# Patient Record
Sex: MALE | Race: WHITE | NOT HISPANIC OR LATINO
[De-identification: names, ages, dates, MRNs, and addresses within clinical notes are randomized per-mention and may not be internally consistent; named-entity substitution may affect disease eponyms.]

---

## 2018-09-28 PROBLEM — Z00.00 ENCOUNTER FOR PREVENTIVE HEALTH EXAMINATION: Status: ACTIVE | Noted: 2018-09-28

## 2018-10-04 ENCOUNTER — APPOINTMENT (OUTPATIENT)
Dept: ORTHOPEDIC SURGERY | Facility: CLINIC | Age: 43
End: 2018-10-04
Payer: COMMERCIAL

## 2018-10-04 VITALS — WEIGHT: 175 LBS | BODY MASS INDEX: 26.52 KG/M2 | HEIGHT: 68 IN

## 2018-10-04 VITALS — DIASTOLIC BLOOD PRESSURE: 95 MMHG | HEART RATE: 73 BPM | SYSTOLIC BLOOD PRESSURE: 147 MMHG

## 2018-10-04 DIAGNOSIS — F19.90 OTHER PSYCHOACTIVE SUBSTANCE USE, UNSPECIFIED, UNCOMPLICATED: ICD-10-CM

## 2018-10-04 DIAGNOSIS — Z78.9 OTHER SPECIFIED HEALTH STATUS: ICD-10-CM

## 2018-10-04 DIAGNOSIS — Z60.2 PROBLEMS RELATED TO LIVING ALONE: ICD-10-CM

## 2018-10-04 PROCEDURE — 73562 X-RAY EXAM OF KNEE 3: CPT | Mod: LT

## 2018-10-04 PROCEDURE — 99203 OFFICE O/P NEW LOW 30 MIN: CPT

## 2018-10-04 SDOH — SOCIAL STABILITY - SOCIAL INSECURITY: PROBLEMS RELATED TO LIVING ALONE: Z60.2

## 2019-01-05 ENCOUNTER — OUTPATIENT (OUTPATIENT)
Dept: OUTPATIENT SERVICES | Facility: HOSPITAL | Age: 44
LOS: 1 days | End: 2019-01-05
Payer: COMMERCIAL

## 2019-01-05 VITALS
HEIGHT: 67.25 IN | HEART RATE: 82 BPM | RESPIRATION RATE: 16 BRPM | OXYGEN SATURATION: 97 % | TEMPERATURE: 97 F | DIASTOLIC BLOOD PRESSURE: 78 MMHG | SYSTOLIC BLOOD PRESSURE: 126 MMHG | WEIGHT: 181 LBS

## 2019-01-05 DIAGNOSIS — Z98.52 VASECTOMY STATUS: Chronic | ICD-10-CM

## 2019-01-05 DIAGNOSIS — G47.33 OBSTRUCTIVE SLEEP APNEA (ADULT) (PEDIATRIC): ICD-10-CM

## 2019-01-05 DIAGNOSIS — S83.512A SPRAIN OF ANTERIOR CRUCIATE LIGAMENT OF LEFT KNEE, INITIAL ENCOUNTER: ICD-10-CM

## 2019-01-05 DIAGNOSIS — T14.8XXA OTHER INJURY OF UNSPECIFIED BODY REGION, INITIAL ENCOUNTER: ICD-10-CM

## 2019-01-05 DIAGNOSIS — T78.40XA ALLERGY, UNSPECIFIED, INITIAL ENCOUNTER: ICD-10-CM

## 2019-01-05 LAB
BUN SERPL-MCNC: 15 MG/DL — SIGNIFICANT CHANGE UP (ref 7–23)
CALCIUM SERPL-MCNC: 9.5 MG/DL — SIGNIFICANT CHANGE UP (ref 8.4–10.5)
CHLORIDE SERPL-SCNC: 101 MMOL/L — SIGNIFICANT CHANGE UP (ref 98–107)
CO2 SERPL-SCNC: 27 MMOL/L — SIGNIFICANT CHANGE UP (ref 22–31)
CREAT SERPL-MCNC: 0.95 MG/DL — SIGNIFICANT CHANGE UP (ref 0.5–1.3)
GLUCOSE SERPL-MCNC: 88 MG/DL — SIGNIFICANT CHANGE UP (ref 70–99)
HCT VFR BLD CALC: 43.8 % — SIGNIFICANT CHANGE UP (ref 39–50)
HGB BLD-MCNC: 14.8 G/DL — SIGNIFICANT CHANGE UP (ref 13–17)
MCHC RBC-ENTMCNC: 29.5 PG — SIGNIFICANT CHANGE UP (ref 27–34)
MCHC RBC-ENTMCNC: 33.8 % — SIGNIFICANT CHANGE UP (ref 32–36)
MCV RBC AUTO: 87.3 FL — SIGNIFICANT CHANGE UP (ref 80–100)
NRBC # FLD: 0 K/UL — LOW (ref 25–125)
PLATELET # BLD AUTO: 299 K/UL — SIGNIFICANT CHANGE UP (ref 150–400)
PMV BLD: 9.2 FL — SIGNIFICANT CHANGE UP (ref 7–13)
POTASSIUM SERPL-MCNC: 4.1 MMOL/L — SIGNIFICANT CHANGE UP (ref 3.5–5.3)
POTASSIUM SERPL-SCNC: 4.1 MMOL/L — SIGNIFICANT CHANGE UP (ref 3.5–5.3)
RBC # BLD: 5.02 M/UL — SIGNIFICANT CHANGE UP (ref 4.2–5.8)
RBC # FLD: 12.8 % — SIGNIFICANT CHANGE UP (ref 10.3–14.5)
SODIUM SERPL-SCNC: 141 MMOL/L — SIGNIFICANT CHANGE UP (ref 135–145)
WBC # BLD: 9.05 K/UL — SIGNIFICANT CHANGE UP (ref 3.8–10.5)
WBC # FLD AUTO: 9.05 K/UL — SIGNIFICANT CHANGE UP (ref 3.8–10.5)

## 2019-01-05 PROCEDURE — 93010 ELECTROCARDIOGRAM REPORT: CPT

## 2019-01-05 NOTE — H&P PST ADULT - HISTORY OF PRESENT ILLNESS
Pt is a 43 y.o. male ; pt states 8/18 ;sustained injury to right knee while playing hockey ; pt to surgeon an mri was done ' There is an acl tear ' Pt now presents for Left Knee Anterior Cruciate Ligament Reconstruction Pt is a 43 y.o. male ; pt states 8/18 ;sustained injury to left knee while playing hockey ; pt to surgeon an mri was done "  There is an acl tear ' Pt now presents for Left Knee Anterior Cruciate Ligament Reconstruction

## 2019-01-05 NOTE — H&P PST ADULT - LYMPHATIC
anterior cervical R/anterior cervical L/supraclavicular L/posterior cervical L/posterior cervical R/supraclavicular R

## 2019-01-05 NOTE — H&P PST ADULT - PROBLEM SELECTOR PLAN 1
Left Knee Anterior Cruciate Ligament Reconstructions    Pre op instructions including Pepcid and Hibiclens given to pt ; pt appears to have a good understanding of pre op instructions

## 2019-01-05 NOTE — H&P PST ADULT - PMH
Cold  pt rwports resolving cold  MICA (obstructive sleep apnea)  pt reports " mild mica" pt denies cpap Cold  pt reports resolving cold  MICA (obstructive sleep apnea)  pt reports " mild mica" pt denies cpap

## 2019-01-07 PROBLEM — G47.33 OBSTRUCTIVE SLEEP APNEA (ADULT) (PEDIATRIC): Chronic | Status: ACTIVE | Noted: 2019-01-05

## 2019-01-07 PROBLEM — J00 ACUTE NASOPHARYNGITIS [COMMON COLD]: Chronic | Status: ACTIVE | Noted: 2019-01-05

## 2019-01-17 ENCOUNTER — TRANSCRIPTION ENCOUNTER (OUTPATIENT)
Age: 44
End: 2019-01-17

## 2019-01-17 RX ORDER — ASPIRIN 325 MG/1
325 TABLET, FILM COATED ORAL DAILY
Qty: 28 | Refills: 0 | Status: ACTIVE | COMMUNITY
Start: 2019-01-17 | End: 1900-01-01

## 2019-01-17 RX ORDER — OXYCODONE AND ACETAMINOPHEN 5; 325 MG/1; MG/1
5-325 TABLET ORAL
Qty: 70 | Refills: 0 | Status: ACTIVE | COMMUNITY
Start: 2019-01-17 | End: 1900-01-01

## 2019-01-18 ENCOUNTER — OUTPATIENT (OUTPATIENT)
Dept: OUTPATIENT SERVICES | Facility: HOSPITAL | Age: 44
LOS: 1 days | Discharge: HOME | End: 2019-01-18
Payer: COMMERCIAL

## 2019-01-18 ENCOUNTER — APPOINTMENT (OUTPATIENT)
Dept: ORTHOPEDIC SURGERY | Facility: AMBULATORY SURGERY CENTER | Age: 44
End: 2019-01-18

## 2019-01-18 VITALS
HEART RATE: 79 BPM | HEIGHT: 67.25 IN | RESPIRATION RATE: 16 BRPM | SYSTOLIC BLOOD PRESSURE: 149 MMHG | WEIGHT: 181 LBS | OXYGEN SATURATION: 99 % | TEMPERATURE: 98 F | DIASTOLIC BLOOD PRESSURE: 89 MMHG

## 2019-01-18 VITALS
SYSTOLIC BLOOD PRESSURE: 124 MMHG | DIASTOLIC BLOOD PRESSURE: 80 MMHG | HEART RATE: 72 BPM | RESPIRATION RATE: 15 BRPM | OXYGEN SATURATION: 97 % | TEMPERATURE: 98 F

## 2019-01-18 DIAGNOSIS — Z98.52 VASECTOMY STATUS: Chronic | ICD-10-CM

## 2019-01-18 DIAGNOSIS — S83.512A SPRAIN OF ANTERIOR CRUCIATE LIGAMENT OF LEFT KNEE, INITIAL ENCOUNTER: ICD-10-CM

## 2019-01-18 PROCEDURE — 29888 ARTHRS AID ACL RPR/AGMNTJ: CPT | Mod: LT

## 2019-01-18 RX ORDER — IBUPROFEN 200 MG
1 TABLET ORAL
Qty: 0 | Refills: 0 | COMMUNITY

## 2019-01-18 RX ORDER — FAMOTIDINE 10 MG/ML
20 INJECTION INTRAVENOUS
Qty: 0 | Refills: 0 | COMMUNITY

## 2019-01-18 NOTE — ASU DISCHARGE PLAN (ADULT/PEDIATRIC). - FOLLOWUP APPOINTMENT CLINIC/PHYSICIAN
Follow up with doctor in 1 week. Call to make appointment.     Begin Physical therapy 1-3 days after surgery.

## 2019-01-18 NOTE — BRIEF OPERATIVE NOTE - POST-OP DX
Complex tear of medial meniscus of right knee as current injury, initial encounter  01/18/2019    Active  Dawit Ren Rupture of anterior cruciate ligament of left knee, initial encounter  01/18/2019    Active  Dawit Ren

## 2019-01-18 NOTE — ASU DISCHARGE PLAN (ADULT/PEDIATRIC). - NOTIFY
Pain not relieved by Medications/Bleeding that does not stop/Numbness, color, or temperature change to extremity/Numbness, tingling/Swelling that continues

## 2019-01-18 NOTE — BRIEF OPERATIVE NOTE - PROCEDURE
<<-----Click on this checkbox to enter Procedure Knee arthroscopy, right  01/18/2019    Active  GIL ACL reconstruction  01/18/2019    Active  GIL

## 2019-01-18 NOTE — ASU DISCHARGE PLAN (ADULT/PEDIATRIC). - MEDICATION SUMMARY - MEDICATIONS TO STOP TAKING
I will STOP taking the medications listed below when I get home from the hospital:    Advil 200 mg oral tablet  -- 1 tab(s) by mouth every 6 hours, As Needed last dose 1/11    famotidine 20 mg oral tablet  -- 20 milligram(s) orally

## 2019-01-31 ENCOUNTER — APPOINTMENT (OUTPATIENT)
Dept: ORTHOPEDIC SURGERY | Facility: CLINIC | Age: 44
End: 2019-01-31
Payer: COMMERCIAL

## 2019-01-31 PROCEDURE — 99024 POSTOP FOLLOW-UP VISIT: CPT

## 2019-01-31 NOTE — HISTORY OF PRESENT ILLNESS
[2] : the patient reports pain that is 2/10 in severity [Chills] : no chills [Fever] : no fever [Nausea] : no nausea [Vomiting] : no vomiting [Clean/Dry/Intact] : clean, dry and intact [Healed] : healed [Erythema] : not erythematous [Discharge] : absent of discharge [Swelling] : swollen [Dehiscence] : not dehisced [Neuro Intact] : an unremarkable neurological exam [Vascular Intact] : ~T peripheral vascular exam normal [Doing Well] : is doing well [Excellent Pain Control] : has excellent pain control [No Sign of Infection] : is showing no signs of infection [Sutures Removed] : sutures were removed [Steri-Strips Removed & Replaced] : steri-strips removed and replaced [de-identified] : 43-year-old male status post left ACL (Ham auto/allo) 1/18/19 [de-identified] : 43-year-old male status post left ACL (Ham auto/allo). He is doing well resting in postop brace. He is not taking pain medication. Denies post op complications.  [de-identified] : Left knee exam\par \par Skin: Incision(s) clean, dry, intact, no drainage, healed\par Inspection: Residual swelling, moderate residual effusion, ecchymosis\par Pulses: 2+ DP/PT pulses\par ROM: Not tested. 0-45\par Tenderness: Tender throughout anterior knee joint.\par Stability: Stable\par Strength: Intact Q/H/TA/GS/EHL\par Neuro: In tact to light touch throughout\par  [de-identified] : 43-year-old male status post left ACL (Ham auto/allo)\par \par All intraoperative imaging was discussed in detail with the patient. All questions were answered. Patient has minimal pain, and is ambulating well without assist.\par \par Recommendations:\par \par 1. PT evaluation and treatment; including AAROM/AROM, therapeutic exercise (include hamstring and quadriceps strengthening), heel slides, nonweightbearing stretch of the gastrocsoleus complex and straight leg raises to prevent extension lag. Progression to closed chain exercises/balance exercise/bike in 2-4 weeks.\par 2. Brace: Unlock brace for ambulation as tolerated. Remove the brace for sleeping as tolerated. May discontinue brace once full extension and without extensor lag has been achieved (approx 4-6wks post-op)\par 3. Meds: Continue [id number] daily, pain medication prn, may transition to NSAIDS.\par 4. Ice/elevate as neededand\par 5. Restrictions: None\par \par Followup in 4 weeks for imaging and clinical evaluation of stability.

## 2019-02-28 ENCOUNTER — APPOINTMENT (OUTPATIENT)
Dept: ORTHOPEDIC SURGERY | Facility: CLINIC | Age: 44
End: 2019-02-28
Payer: COMMERCIAL

## 2019-02-28 PROCEDURE — 99024 POSTOP FOLLOW-UP VISIT: CPT

## 2019-02-28 PROCEDURE — 73562 X-RAY EXAM OF KNEE 3: CPT | Mod: LT

## 2019-02-28 NOTE — HISTORY OF PRESENT ILLNESS
[0] : no pain reported [Clean/Dry/Intact] : clean, dry and intact [Healed] : healed [Swelling] : swollen [Neuro Intact] : an unremarkable neurological exam [Vascular Intact] : ~T peripheral vascular exam normal [Doing Well] : is doing well [Excellent Pain Control] : has excellent pain control [No Sign of Infection] : is showing no signs of infection [Chills] : no chills [Fever] : no fever [Nausea] : no nausea [Vomiting] : no vomiting [Erythema] : not erythematous [Discharge] : absent of discharge [Dehiscence] : not dehisced [de-identified] : 43-year-old male status post left ACL (Ham auto/allo) 1/18/19 [de-identified] : 43-year-old male status post left ACL (Ham auto/allo). He is doing well, discontinued postop brace. He is not taking pain medication. Denies post op complications. Intermittent pain, otherwise well. Progressing with PT. [de-identified] : Left knee exam\par \par Skin: Incision(s) clean, dry, intact, no drainage, healed\par Inspection: Min swelling, moderate residual effusion, ecchymosis\par Pulses: 2+ DP/PT pulses\par ROM: 0-125\par Tenderness: Min\par Stability: Stable, IA lachman\par Strength: 4/5 Q/H 5/5 TA/GS/EHL, \par Neuro: In tact to light touch throughout [de-identified] : \par The following radiographs were ordered and read by me during this patients visit. I reviewed each radiograph in detail with the patient and discussed the findings as highlighted below. \par \par 3 views of the left knee were obtained today that show interval change of an anatomic anterior cruciate ligament reconstruction with hamstring autograft/allograft. No evidence of hardware failure, otherwise unremarkable.  [de-identified] : 43-year-old male status post left ACL (Ham auto/allo)\par \par Postoperative imaging shows anatomic ACL reconstruction. Patient has good clinical stability on examination, and is doing well with postoperative rehabilitation at this time. Patient is doing extremely well at this time. Discussed the continued progressions as below.\par \par Recommendations:\par \par 1. Continue PT per protocol; WBAT, closed chain exercises, proprioception exercise, begin elliptical when tolerated, hamstring stretching/strengthening\par 2. Brace: May discontinue brace once full extension and without extensor lag has been achieved\par 3. Meds: Discontinue [id number] daily, OTC pain/Nsaids medication prn\par 4. Continue symptommatic Ice/elevate as needed\par 5. Restrictions: None\par \par Followup in 6 weeks. \par \par

## 2019-04-23 ENCOUNTER — APPOINTMENT (OUTPATIENT)
Dept: ORTHOPEDIC SURGERY | Facility: CLINIC | Age: 44
End: 2019-04-23
Payer: COMMERCIAL

## 2019-04-23 VITALS — BODY MASS INDEX: 26.52 KG/M2 | WEIGHT: 175 LBS | HEIGHT: 68 IN

## 2019-04-23 PROCEDURE — 99213 OFFICE O/P EST LOW 20 MIN: CPT

## 2019-05-07 ENCOUNTER — RX RENEWAL (OUTPATIENT)
Age: 44
End: 2019-05-07

## 2019-07-23 ENCOUNTER — APPOINTMENT (OUTPATIENT)
Dept: ORTHOPEDIC SURGERY | Facility: CLINIC | Age: 44
End: 2019-07-23
Payer: COMMERCIAL

## 2019-07-23 PROCEDURE — 99213 OFFICE O/P EST LOW 20 MIN: CPT

## 2019-07-23 PROCEDURE — 73562 X-RAY EXAM OF KNEE 3: CPT | Mod: LT

## 2019-09-06 NOTE — HISTORY OF PRESENT ILLNESS
[de-identified] : 43-year-old male status post left ACL (Ham auto/allo) 1/18/19. Reports occasional tightness on the lateral aspect of his knee. Attending PT 2 x per week. He has no complaints. He is still doing physical therapy. He denies instability. \par

## 2019-09-06 NOTE — PHYSICAL EXAM
[de-identified] : Left knee exam\par \par Skin: Incision(s) clean, dry, intact, no drainage, healed\par Inspection: no swelling, no effusion\par Pulses: 2+ DP/PT pulses\par ROM: 0-135\par Tenderness: none\par Stability: Stable, IA lachman\par Strength: 4+/5 Q/H 5/5 TA/GS/EHL, \par Neuro: In tact to light touch throughout [de-identified] : \par The following radiographs were ordered and read by me during this patients visit. I reviewed each radiograph in detail with the patient and discussed the findings as highlighted below. \par \par 3 views of the left knee were obtained today that show interval change of an anatomic anterior cruciate ligament reconstruction with hamstring autograft. No evidence of hardware failure, otherwise unremarkable. Mild distal tunnel widening.

## 2019-09-06 NOTE — DISCUSSION/SUMMARY
[de-identified] : 43-year-old male status post left ACL (Ham auto/allo)\par \par Patient has had an uncomplicated postoperative course following left anterior cruciate ligament reconstruction with hamstring autograft/allograft. Currently the patient reports good subjective stability and functional outcome and has completed a rigorous physical therapy protocol. \par \par Discussion was centered today on goals of activity and return to pivoting/cutting exercise. Outlined subjectivity on return to play and sport specific exercise, and possible utilization of objective return to play criteria through a physical therapy guided RTP protocol. Also discussed the utility of bracing in the postoperative period for return to sport. It is not typically recommended given stability of construct, but patient may continue to experience anxiety/loss of confidence over the ensuing months.\par \par Recommendations:\par 1. Continued flexibility training, endurance training and strengthening/conditioning to the quadriceps/hamstring/core to maintain stability. Full return to sport at 9-12months \par 2. Completion of a RTP program when able\par 3. Utilize ice/rest as needed\par 4. Bracing as discussed\par \par Follow-up:\par As needed following completion RTP program

## 2019-10-02 PROBLEM — Z60.2 PERSON LIVING ALONE: Status: ACTIVE | Noted: 2018-10-04

## 2019-10-21 ENCOUNTER — APPOINTMENT (OUTPATIENT)
Dept: ORTHOPEDIC SURGERY | Facility: CLINIC | Age: 44
End: 2019-10-21
Payer: COMMERCIAL

## 2019-10-21 DIAGNOSIS — S89.92XD UNSPECIFIED INJURY OF LEFT LOWER LEG, SUBSEQUENT ENCOUNTER: ICD-10-CM

## 2019-10-21 PROCEDURE — 99213 OFFICE O/P EST LOW 20 MIN: CPT

## 2019-10-23 PROBLEM — S89.92XD ACUTE INJURY OF LEFT ANTERIOR CRUCIATE LIGAMENT, SUBSEQUENT ENCOUNTER: Status: ACTIVE | Noted: 2018-11-09

## 2019-10-23 NOTE — ADDENDUM
[FreeTextEntry1] : This note was written by Elena Alves on 10/21/2019 acting solely as a scribe for Dr. Everett Rosario.\par \par All medical record entries made by the Scribe were at my, Dr. Everett Rosario, direction and personally dictated by me on 10/21/2019. I have personally reviewed the chart and agree that the record accurately reflects my personal performance of the history, physical exam, assessment and plan.\par

## 2019-10-23 NOTE — HISTORY OF PRESENT ILLNESS
[de-identified] : 44-year-old male status post left ACL (Ham auto/allo) 1/18/19. He states that he feels like "something" is in his knee, and cannot shake this feeling. He wonders whether this is normal vs. abnormal. He is able to run and be active without pain. Reports soreness/ tightness at the end of day. He denies instability. He is not taking pain medication. \par

## 2019-10-23 NOTE — DISCUSSION/SUMMARY
[de-identified] : 44-year-old male status post left ACL (Ham auto/allo)\par \par Patient continues to complain of some mechanical symptoms within the left knee. I discussed with the patient that this may be normal for a postoperative ACL reconstruction, and may be part of the scar tissue incorporation of the ACL graft. Discussed with him the utility of MRI imaging to confirm that there is no internal derangement that would be concerning. Patient reports that he would likely not undergo any surgical intervention for this current discomfort as it is nonpainful.\par \par Recommendations:\par 1. Continued flexibility training, endurance training and strengthening/conditioning to the quadriceps/hamstring/core to maintain stability. Full return to sport at 9-12months \par 2. Completion of a RTP program when able\par 3. Utilize ice/rest as needed\par \par Rx for MRI given.\par \par Follow-up: after MRI if symptoms worsen or 3 months.

## 2019-10-23 NOTE — PHYSICAL EXAM
[de-identified] : Oriented to time, place, person\par Mood: Normal\par Affect: Normal\par Appearance: Healthy, well appearing, no acute distress.\par Gait: Normal\par Assistive Devices: None\par \par Left knee exam\par \par Skin: Incision(s) clean, dry, intact, no drainage, healed\par Inspection: no swelling, no effusion\par Pulses: 2+ DP/PT pulses\par ROM: 0-135\par Tenderness: none\par Stability: Stable, IA lachman\par Strength: 4+/5 Q/H 5/5 TA/GS/EHL, \par Neuro: In tact to light touch throughout [de-identified] : 3 views of the left knee were obtained 7-23-19 that show interval change of an anatomic anterior cruciate ligament reconstruction with hamstring autograft. No evidence of hardware failure, otherwise unremarkable. Mild distal tunnel widening.

## 2019-10-24 ENCOUNTER — OTHER (OUTPATIENT)
Age: 44
End: 2019-10-24

## 2019-10-28 ENCOUNTER — OTHER (OUTPATIENT)
Age: 44
End: 2019-10-28

## 2019-11-04 ENCOUNTER — OTHER (OUTPATIENT)
Age: 44
End: 2019-11-04

## 2020-01-23 ENCOUNTER — APPOINTMENT (OUTPATIENT)
Dept: ORTHOPEDIC SURGERY | Facility: CLINIC | Age: 45
End: 2020-01-23

## 2025-04-11 NOTE — H&P PST ADULT - HEIGHT IN FEET
5
right foot swelling noted to anterior foot between 2nd and 3rd toe, no discoloration noted, pedal pulse present